# Patient Record
Sex: MALE | NOT HISPANIC OR LATINO | URBAN - METROPOLITAN AREA
[De-identification: names, ages, dates, MRNs, and addresses within clinical notes are randomized per-mention and may not be internally consistent; named-entity substitution may affect disease eponyms.]

---

## 2018-07-31 ENCOUNTER — OFFICE VISIT (OUTPATIENT)
Dept: URBAN - METROPOLITAN AREA CLINIC 32 | Facility: CLINIC | Age: 53
End: 2018-07-31
Payer: COMMERCIAL

## 2018-07-31 PROCEDURE — 99203 OFFICE O/P NEW LOW 30 MIN: CPT | Performed by: OPTOMETRIST

## 2018-07-31 RX ORDER — LOTEPREDNOL ETABONATE AND TOBRAMYCIN 5; 3 MG/ML; MG/ML
SUSPENSION/ DROPS OPHTHALMIC
Qty: 5 | Refills: 6 | Status: INACTIVE
Start: 2018-07-31 | End: 2018-08-01

## 2018-07-31 ASSESSMENT — INTRAOCULAR PRESSURE
OD: 18
OS: 17

## 2018-08-15 ENCOUNTER — OFFICE VISIT (OUTPATIENT)
Dept: URBAN - METROPOLITAN AREA CLINIC 32 | Facility: CLINIC | Age: 53
End: 2018-08-15
Payer: COMMERCIAL

## 2018-08-15 PROCEDURE — 99213 OFFICE O/P EST LOW 20 MIN: CPT | Performed by: OPTOMETRIST

## 2018-08-15 ASSESSMENT — INTRAOCULAR PRESSURE
OD: 16
OS: 17

## 2018-08-15 NOTE — IMPRESSION/PLAN
Impression: Chalazion left lower eyelid: H00.15.  Plan: WC LS PF BID OS, Ofloxacin BID, and hot compress, consider iLux

## 2018-09-26 ENCOUNTER — OFFICE VISIT (OUTPATIENT)
Dept: URBAN - METROPOLITAN AREA CLINIC 32 | Facility: CLINIC | Age: 53
End: 2018-09-26
Payer: COMMERCIAL

## 2018-09-26 DIAGNOSIS — H00.15 CHALAZION LEFT LOWER EYELID: Primary | ICD-10-CM

## 2018-09-26 PROCEDURE — 99213 OFFICE O/P EST LOW 20 MIN: CPT | Performed by: OPTOMETRIST

## 2018-09-26 RX ORDER — AZITHROMYCIN DIHYDRATE 250 MG/1
250 MG TABLET, FILM COATED ORAL
Qty: 1 | Refills: 0 | Status: INACTIVE
Start: 2018-09-26 | End: 2018-09-30

## 2018-09-26 ASSESSMENT — INTRAOCULAR PRESSURE
OD: 14
OS: 14

## 2018-09-26 NOTE — IMPRESSION/PLAN
Impression: Chalazion left lower eyelid: H00.15.  Plan: cont, WC LS PF BID OS, Ofloxacin BID, and hot compress, oral AB

## 2018-10-12 ENCOUNTER — OFFICE VISIT (OUTPATIENT)
Dept: URBAN - METROPOLITAN AREA CLINIC 33 | Facility: CLINIC | Age: 53
End: 2018-10-12
Payer: COMMERCIAL

## 2018-10-12 PROCEDURE — 99214 OFFICE O/P EST MOD 30 MIN: CPT | Performed by: OPHTHALMOLOGY

## 2018-10-12 RX ORDER — NEOMYCIN SULFATE, POLYMYXIN B SULFATE AND DEXAMETHASONE 3.5; 10000; 1 MG/G; [USP'U]/G; MG/G
OINTMENT OPHTHALMIC
Qty: 1 | Refills: 0 | Status: INACTIVE
Start: 2018-10-12 | End: 2019-03-22

## 2018-10-12 ASSESSMENT — INTRAOCULAR PRESSURE
OS: 14
OD: 15

## 2018-10-12 NOTE — IMPRESSION/PLAN
Impression: Chalazion left lower eyelid: H00.15. Plan: Discussed diagnosis in detail with patient. Discussed treatment options with patient. Patient instructed to apply warm compresses w/ firm massage. Lid scrubs and hygiene were explained, start Ocusoft plus. Start Maxitrol varsha BID to Left lower  Eyelid. If no improvement consider Kenalog Inj and/or I&D of chalazion of left lower Eyelid.

## 2019-01-25 ENCOUNTER — PROCEDURE (OUTPATIENT)
Dept: URBAN - METROPOLITAN AREA CLINIC 33 | Facility: CLINIC | Age: 54
End: 2019-01-25
Payer: COMMERCIAL

## 2019-01-25 PROCEDURE — 11900 INJECT SKIN LESIONS </W 7: CPT | Performed by: OPHTHALMOLOGY

## 2019-03-22 ENCOUNTER — OFFICE VISIT (OUTPATIENT)
Dept: URBAN - METROPOLITAN AREA CLINIC 33 | Facility: CLINIC | Age: 54
End: 2019-03-22
Payer: COMMERCIAL

## 2019-03-22 DIAGNOSIS — H01.025 SQUAMOUS BLEPHARITIS LEFT LOWER EYELID: Primary | ICD-10-CM

## 2019-03-22 PROCEDURE — 99213 OFFICE O/P EST LOW 20 MIN: CPT | Performed by: OPTOMETRIST

## 2019-03-22 RX ORDER — NEOMYCIN SULFATE, POLYMYXIN B SULFATE AND DEXAMETHASONE 3.5; 10000; 1 MG/G; [USP'U]/G; MG/G
OINTMENT OPHTHALMIC
Qty: 1 | Refills: 2 | Status: ACTIVE
Start: 2019-03-22

## 2019-03-22 ASSESSMENT — INTRAOCULAR PRESSURE
OD: 16
OS: 15

## 2019-03-22 NOTE — IMPRESSION/PLAN
Impression: Squamous blepharitis left lower eyelid: H01.025. Plan: Blepharitis accounts for the patient's complaints. The condition appears chronic and eyelid scrubs and warm compresses with antibiotic ointment have been suggested. The patient understands this may not cure the condition and that there may be the need to be on a maintenance program. The patient was informed that the condition may take a few weeks to improve. 

Eyelid  + Maxitrol Ileana. QHS + Warm compress